# Patient Record
Sex: FEMALE | Race: BLACK OR AFRICAN AMERICAN
[De-identification: names, ages, dates, MRNs, and addresses within clinical notes are randomized per-mention and may not be internally consistent; named-entity substitution may affect disease eponyms.]

---
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
## 2018-08-22 ENCOUNTER — TELEPHONE (OUTPATIENT)
Dept: OBSTETRICS AND GYNECOLOGY | Age: 18
End: 2018-08-22

## 2018-09-05 ENCOUNTER — INITIAL PRENATAL (OUTPATIENT)
Dept: OBSTETRICS AND GYNECOLOGY | Age: 18
End: 2018-09-05

## 2018-09-05 ENCOUNTER — PROCEDURE VISIT (OUTPATIENT)
Dept: OBSTETRICS AND GYNECOLOGY | Age: 18
End: 2018-09-05

## 2018-09-05 VITALS
HEIGHT: 67 IN | BODY MASS INDEX: 26.53 KG/M2 | SYSTOLIC BLOOD PRESSURE: 100 MMHG | DIASTOLIC BLOOD PRESSURE: 70 MMHG | WEIGHT: 169 LBS

## 2018-09-05 DIAGNOSIS — Z36.86 ENCOUNTER FOR SCREENING FOR RISK OF PRE-TERM LABOR: Primary | ICD-10-CM

## 2018-09-05 DIAGNOSIS — Z34.82 ENCOUNTER FOR SUPERVISION OF OTHER NORMAL PREGNANCY IN SECOND TRIMESTER: Primary | ICD-10-CM

## 2018-09-05 DIAGNOSIS — Z36.89 ENCOUNTER FOR FETAL ANATOMIC SURVEY: ICD-10-CM

## 2018-09-05 DIAGNOSIS — O23.40 URINARY TRACT INFECTION AFFECTING PREGNANCY: ICD-10-CM

## 2018-09-05 DIAGNOSIS — Z29.13 NEED FOR RHOGAM DUE TO RH NEGATIVE MOTHER: ICD-10-CM

## 2018-09-05 PROBLEM — Z34.92 ENCOUNTER FOR SUPERVISION OF NORMAL PREGNANCY IN SECOND TRIMESTER: Status: ACTIVE | Noted: 2018-09-05

## 2018-09-05 PROCEDURE — 76805 OB US >/= 14 WKS SNGL FETUS: CPT | Performed by: OBSTETRICS & GYNECOLOGY

## 2018-09-05 PROCEDURE — 99203 OFFICE O/P NEW LOW 30 MIN: CPT | Performed by: OBSTETRICS & GYNECOLOGY

## 2018-09-05 PROCEDURE — 76817 TRANSVAGINAL US OBSTETRIC: CPT | Performed by: OBSTETRICS & GYNECOLOGY

## 2018-09-05 RX ORDER — PRENATAL VIT NO.126/IRON/FOLIC 28MG-0.8MG
TABLET ORAL DAILY
COMMUNITY

## 2018-09-05 NOTE — PROGRESS NOTES
US completed PRATIMA Honeycutt RN RDMS      Ultrasound Note     2018    Patient:  Jb Piña      MR#:[id number]    17 y.o.   for anatomic survey    Patient Active Problem List   Diagnosis   • Encounter for supervision of normal pregnancy in second trimester   • Need for rhogam due to Rh negative mother       [See the scanned report in the media tab for more details]    Impression    1.  Intrauterine pregnancy at 18w0d  2.  Normal limited anatomy survey, plan to repeat in two weeks. Outflow tracts not well visualized  3.  Fetus is female  4.  Cervical length 2.93- repeat on follow up anatomy    Relevant comparison data available:  [x] None      Cassie Prajapati MD  2018 2:40 PM

## 2018-09-05 NOTE — PROGRESS NOTES
Chief Complaint   Patient presents with   • Initial Prenatal Visit       HPI:  Pt presents for initial prenatal visit [facility] [place], no complaints. Had nexplanon but had it removed then got pregnant. occ N/V, none currently. Uses phenergan as prev prescribed and helps but makes her sleepy.   Notes that they though her prior pregnancy was measuring small but at time of delivery was normal at 6 lb 8 oz, 39w2d approx.  Received rhogam earlier in this preg for bleeding, no bleeding since. Discussed possible need if more bleeding or abd trauma.    ROS:  No fever or chills, no contractions, no leg pain, no LE edema, no leaking fluid, no bleeding, no headache, no dysuria  All other systems reviewed and negative    Exam:  See flow sheet  General:  Alert and oriented and no distress  Neck: no lymphadenopathy or thyromegaly  Lungs: non - labored breathing  Abd:  See flow sheet, fundus nontender  Ext: see flow sheet, non-tender bilateral , no lesions  Neuro: grossly normal  Pelvic: cervix closed and long, gynecoid pelvis    Assessment:/ PLAN:  17 y.o.  at 18w0d  Prior 5w5d US however bleeding issue at the time, unsure of LMP. 13 wk sonogram and 18 wk sonogram today more consistent with JOE of  and plan to change due date to  rather than       Problems Addressed this Visit     Encounter for supervision of normal pregnancy in second trimester     Initial care at [place], txfer here  O neg/I/-/- NR  GC/CT neg  No pap due to age  Urine + staph epiderm s/p tx and repeat culture today           Need for rhogam due to Rh negative mother     S/p rhogam 18 for bleeding early preg. Discussed bleeding/trauma and possible need for further rhogam otherwise at 28 wks           Other Visit Diagnoses     Urinary tract infection affecting pregnancy    -  Primary        Possible cervical shortening today 2.93 cm, plan to repeat with follow up anatomy in two weeks. Limited anatomy today  normal.    S/p genetic screening with [facility], will need to request record.      Pregnancy Risk:  NORMAL      Follow up in 2 wk sonogram, 4 wks MD visit    Cassie Prajapati MD  09/05/2018  10:17 AM

## 2018-09-05 NOTE — ASSESSMENT & PLAN NOTE
S/p rhogam 6/7/18 for bleeding early preg. Discussed bleeding/trauma and possible need for further rhogam otherwise at 28 wks

## 2018-09-05 NOTE — PATIENT INSTRUCTIONS
Common safe medications in pregnancy    Pain- tylenol/acetominophen rather than ibuprofen/aleve/motrin. Limit tylenol to no more than 3000 mg per day  Constipation- miralax and generic form, colace stool softener  For upset stomach- DO NOT use pepto-bismol  Allergy- benadryl, zyrtec, claritin, xyzal  Cough/cold- mucinex, sudafed if you do not have high blood pressure, ocean nasal spray, dextromethorphan

## 2018-09-05 NOTE — ASSESSMENT & PLAN NOTE
Initial care at [facility], txfer here  O neg/I/-/- NR  GC/CT neg  No pap due to age  Urine + staph epiderm s/p tx and repeat culture today

## 2018-09-07 LAB
BACTERIA UR CULT: NORMAL
BACTERIA UR CULT: NORMAL